# Patient Record
Sex: MALE | Race: WHITE | NOT HISPANIC OR LATINO | Employment: UNEMPLOYED | URBAN - METROPOLITAN AREA
[De-identification: names, ages, dates, MRNs, and addresses within clinical notes are randomized per-mention and may not be internally consistent; named-entity substitution may affect disease eponyms.]

---

## 2022-12-26 ENCOUNTER — HOSPITAL ENCOUNTER (EMERGENCY)
Facility: HOSPITAL | Age: 37
Discharge: HOME | End: 2022-12-26
Attending: EMERGENCY MEDICINE

## 2022-12-26 VITALS
DIASTOLIC BLOOD PRESSURE: 60 MMHG | TEMPERATURE: 97.1 F | SYSTOLIC BLOOD PRESSURE: 130 MMHG | RESPIRATION RATE: 18 BRPM | HEIGHT: 69 IN | HEART RATE: 87 BPM | WEIGHT: 180 LBS | BODY MASS INDEX: 26.66 KG/M2 | OXYGEN SATURATION: 97 %
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
## 2022-12-26 DIAGNOSIS — T33.531A FROSTBITE OF FINGER OF RIGHT HAND: ICD-10-CM

## 2022-12-26 DIAGNOSIS — T33.532A FROSTBITE OF FINGER OF LEFT HAND: Primary | ICD-10-CM
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
## 2022-12-26 RX ORDER — GINSENG 100 MG
1 CAPSULE ORAL ONCE
Status: COMPLETED | OUTPATIENT
Start: 2022-12-26 | End: 2022-12-26

## 2022-12-26 RX ORDER — GINSENG 100 MG
1 CAPSULE ORAL 2 TIMES DAILY
Qty: 28 G | Refills: 0 | Status: SHIPPED | OUTPATIENT
Start: 2022-12-26

## 2022-12-26 RX ADMIN — BACITRACIN 1 SMALL APPLICATION: 500 OINTMENT TOPICAL at 17:19

## 2022-12-26 NOTE — DISCHARGE INSTRUCTIONS
Do not open the blisters they will open on their own    Bacitracin dressings daily  Follow-up with hand surgery for debridement if needed  Return increasing redness swelling or any problems

## 2022-12-26 NOTE — ED PROVIDER NOTES
History  Chief Complaint   Patient presents with   • Frostbite     Pt reports he was outside working on his car 2 days ago and feels he now has frostbite on the tips of his fingers  HPI patient is is a 58-year-old male reports working outside on a car during freezing temperatures  He reports now the tips of his fingers appear like they are burning  Patient complains of pain irritation at the skin sites  He reports he believes he is up-to-date on his tetanus shots  Has any fever or chills  Past medical history nonsignificant  Social history, smoker    None       History reviewed  No pertinent past medical history  History reviewed  No pertinent surgical history  History reviewed  No pertinent family history  I have reviewed and agree with the history as documented  E-Cigarette/Vaping     E-Cigarette/Vaping Substances     Social History     Tobacco Use   • Smoking status: Every Day     Packs/day: 1 00     Types: Cigarettes   • Smokeless tobacco: Never   Substance Use Topics   • Alcohol use: Never   • Drug use: Never       Review of Systems   Constitutional: Negative for chills and fever  Skin: Positive for wound  Physical Exam  Physical Exam  Constitutional:       Appearance: Normal appearance  Skin:     General: Skin is warm and dry  Comments: Patient's left hand shows blisters on each of the tips of all of his fingers  Several of the blisters are deroofing  Other blisters are intact  Patient's blisters are essentially 1 x 1 cm on the left hand and chilly all 5 of the fingers  On the right hand patient has 2 blisters on his fourth and fifth fingers again 1 x 1 cm, those blisters are intact  Neurological:      Mental Status: He is alert           Vital Signs  ED Triage Vitals [12/26/22 1652]   Temperature Pulse Respirations Blood Pressure SpO2   (!) 97 1 °F (36 2 °C) 87 18 130/60 97 %      Temp Source Heart Rate Source Patient Position - Orthostatic VS BP Location FiO2 (%) Temporal Monitor Sitting Left arm --      Pain Score       --           Vitals:    12/26/22 1652   BP: 130/60   Pulse: 87   Patient Position - Orthostatic VS: Sitting         Visual Acuity      ED Medications  Medications   bacitracin topical ointment 1 small application (1 small application Topical Given 12/26/22 2219)       Diagnostic Studies  Results Reviewed     None                 No orders to display              Procedures  Procedures         ED Course                                             MDM decision making 50-year-old male presents emergency department with frostbite at the tips of his fingers  Discussed with patient the general rule is frostbite in January amputate in July  No indication for debridement at this time  We discussed essentially similar to burn care with antibiotic cream and follow-up  Disposition  Final diagnoses:   Frostbite of finger of left hand - Multiple fingers   Frostbite of finger of right hand - Multiple fingers     Time reflects when diagnosis was documented in both MDM as applicable and the Disposition within this note     Time User Action Codes Description Comment    12/26/2022  5:03 PM Ruben Carbo Add [L18 918Q,  X31  XXXA] Frostbite of finger of left hand     12/26/2022  5:03 PM Ruben Carbo Add [E50 421R,  X31  XXXA] Frostbite of finger of right hand     12/26/2022  5:03 PM Ruben Carbo Modify [T57 796R,  X31  XXXA] Frostbite of finger of left hand Notable fingers multiple for    12/26/2022  5:03 PM Ruben Carbo Modify [Z19 456P,  X31  XXXA] Frostbite of finger of left hand Multiple fingers    12/26/2022  5:04 PM Ruben Carbo Modify [R65 137A,  X31  XXXA] Frostbite of finger of right hand Multiple fingers      ED Disposition     ED Disposition   Discharge    Condition   Stable    Date/Time   Mon Dec 26, 2022  5:02 PM    Comment   Stephan Storm discharge to home/self care                 Follow-up Information     Follow up With Specialties Details Why Contact MD [facility]   [place]  [phone]            Discharge Medication List as of 12/26/2022  5:05 PM      START taking these medications    Details   bacitracin topical ointment 500 units/g topical ointment Apply 1 large application topically 2 (two) times a day Apply to fingertips, Starting Mon 12/26/2022, Print             No discharge procedures on file      PDMP Review     None          ED Provider  Electronically Signed by           Yanni Alamo MD  12/26/22 0185